# Patient Record
Sex: FEMALE | Race: BLACK OR AFRICAN AMERICAN | URBAN - METROPOLITAN AREA
[De-identification: names, ages, dates, MRNs, and addresses within clinical notes are randomized per-mention and may not be internally consistent; named-entity substitution may affect disease eponyms.]

---

## 2020-09-23 ENCOUNTER — WEB ENCOUNTER (OUTPATIENT)
Dept: URBAN - METROPOLITAN AREA CLINIC 13 | Facility: CLINIC | Age: 30
End: 2020-09-23

## 2020-10-16 ENCOUNTER — WEB ENCOUNTER (OUTPATIENT)
Dept: URBAN - METROPOLITAN AREA CLINIC 113 | Facility: CLINIC | Age: 30
End: 2020-10-16

## 2020-10-16 ENCOUNTER — DASHBOARD ENCOUNTERS (OUTPATIENT)
Age: 30
End: 2020-10-16

## 2020-10-16 ENCOUNTER — OFFICE VISIT (OUTPATIENT)
Dept: URBAN - METROPOLITAN AREA CLINIC 113 | Facility: CLINIC | Age: 30
End: 2020-10-16
Payer: COMMERCIAL

## 2020-10-16 VITALS
SYSTOLIC BLOOD PRESSURE: 128 MMHG | TEMPERATURE: 97.8 F | DIASTOLIC BLOOD PRESSURE: 81 MMHG | HEART RATE: 73 BPM | BODY MASS INDEX: 25.49 KG/M2 | WEIGHT: 135 LBS | HEIGHT: 61 IN

## 2020-10-16 DIAGNOSIS — R68.81 EARLY SATIETY: ICD-10-CM

## 2020-10-16 DIAGNOSIS — R63.4 WEIGHT LOSS: ICD-10-CM

## 2020-10-16 PROBLEM — 89362005: Status: ACTIVE | Noted: 2020-10-16

## 2020-10-16 PROCEDURE — 99243 OFF/OP CNSLTJ NEW/EST LOW 30: CPT | Performed by: INTERNAL MEDICINE

## 2020-10-16 NOTE — HPI-TODAY'S VISIT:
Ms. Chavez is a 30 year old female referred by Dr. Anna Schneider for early satiety.   A copy of this document is being forwarded to the referring provider.   She has been having early satiety and weight loss for the last 6 months.  When she eats she gets full quickly.  She has lost 13lbs.  SHe denies abdominal pain, nausea, vomiting, change in bowel habits, blood in the stool or weight loss..  She will use Motrin during her cycle for 2 days.  No family history of colon cancer, inflammatory bowel disease GI cancers, peptic ulcer disease or chronic liver disease.    The patient denies any significant alcohol use, tobacco use or illicit drug use.  No prior endoscopies.  Labs 8/28/2020 WBC 6.1, hemoglobin 12.2, MCV 86, platelets 352; thyroid function reportedly normal.

## 2020-10-27 ENCOUNTER — OFFICE VISIT (OUTPATIENT)
Dept: URBAN - METROPOLITAN AREA SURGERY CENTER 25 | Facility: SURGERY CENTER | Age: 30
End: 2020-10-27

## 2020-12-21 ENCOUNTER — OFFICE VISIT (OUTPATIENT)
Dept: URBAN - METROPOLITAN AREA CLINIC 113 | Facility: CLINIC | Age: 30
End: 2020-12-21